# Patient Record
(demographics unavailable — no encounter records)

---

## 2025-07-22 NOTE — ASSESSMENT
[FreeTextEntry1] : The patient is an 88-year-old female with bilateral lower extremity venous stasis venous insufficiency, pitting edema. Venous duplex was performed in my office that shows no evidence of acute or chronic deep vein thrombosis bilaterally. left leg ruptured bakers cyst  I recommend increase mobility and ambulation Compression stocking therapy a 20 to 30 mmHg compression knee-high's to be worn daily Daily skin emollient  I, Dr. Jonh Keith, personally performed the evaluation and management (E/M) services for this new patient. That E/M includes conducting the clinically appropriate initial history &/or exam, assessing all conditions, and establishing the plan of care. Today, my LUIS FERNANDO, Dina Camacho PA-C, was here to observe my evaluation and management service for this patient & follow plan of care established by me going forward.

## 2025-07-22 NOTE — HISTORY OF PRESENT ILLNESS
[FreeTextEntry1] : The patient is an 88-year-old female who presents with limited mobility.  She ambulates with a walker secondary to balance issues and leg fatigue.  The patient presents today complaining of leg swelling.  She denies past medical history of DVT.

## 2025-07-22 NOTE — PHYSICAL EXAM
[2+] : left 2+ [Ankle Swelling (On Exam)] : present [Ankle Swelling Bilaterally] : bilaterally  [Ankle Swelling On The Left] : moderate [] : bilaterally [Ankle Swelling On The Right] : mild [Varicose Veins Of Lower Extremities] : not present [FreeTextEntry1] : 2+ pitting edema